# Patient Record
Sex: FEMALE | Race: WHITE
[De-identification: names, ages, dates, MRNs, and addresses within clinical notes are randomized per-mention and may not be internally consistent; named-entity substitution may affect disease eponyms.]

---

## 2017-01-09 ENCOUNTER — HOSPITAL ENCOUNTER (OUTPATIENT)
Dept: HOSPITAL 45 - C.LAB | Age: 82
Discharge: HOME | End: 2017-01-09
Payer: COMMERCIAL

## 2017-01-09 VITALS
BODY MASS INDEX: 26.33 KG/M2 | WEIGHT: 163.8 LBS | HEIGHT: 65.98 IN | BODY MASS INDEX: 26.33 KG/M2 | WEIGHT: 163.8 LBS | HEIGHT: 65.98 IN

## 2017-01-09 DIAGNOSIS — Z01.810: Primary | ICD-10-CM

## 2017-01-09 LAB
ANION GAP SERPL CALC-SCNC: 10 MMOL/L (ref 3–11)
BASOPHILS # BLD: 0.02 K/UL (ref 0–0.2)
BASOPHILS NFR BLD: 0.3 %
BUN SERPL-MCNC: 15 MG/DL (ref 7–18)
BUN/CREAT SERPL: 21.6 (ref 10–20)
CALCIUM SERPL-MCNC: 8.7 MG/DL (ref 8.5–10.1)
CHLORIDE SERPL-SCNC: 102 MMOL/L (ref 98–107)
CO2 SERPL-SCNC: 26 MMOL/L (ref 21–32)
COMPLETE: YES
CREAT CL PREDICTED SERPL C-G-VRATE: 63.9 ML/MIN
CREAT SERPL-MCNC: 0.7 MG/DL (ref 0.6–1.2)
EOSINOPHIL NFR BLD AUTO: 270 K/UL (ref 130–400)
EST. AVERAGE GLUCOSE BLD GHB EST-MCNC: 108 MG/DL
GLUCOSE SERPL-MCNC: 90 MG/DL (ref 70–99)
HCT VFR BLD CALC: 42.2 % (ref 37–47)
IG%: 0.2 %
IMM GRANULOCYTES NFR BLD AUTO: 25.7 %
INR PPP: 0.9 (ref 0.9–1.1)
LYMPHOCYTES # BLD: 1.54 K/UL (ref 1.2–3.4)
MCH RBC QN AUTO: 31.7 PG (ref 25–34)
MCHC RBC AUTO-ENTMCNC: 34.6 G/DL (ref 32–36)
MCV RBC AUTO: 91.7 FL (ref 80–100)
MONOCYTES NFR BLD: 9 %
NEUTROPHILS # BLD AUTO: 2.7 %
NEUTROPHILS NFR BLD AUTO: 62.1 %
PARTIAL THROMBOPLASTIN RATIO: 1
PMV BLD AUTO: 9.7 FL (ref 7.4–10.4)
POTASSIUM SERPL-SCNC: 4.1 MMOL/L (ref 3.5–5.1)
PROTHROMBIN TIME: 9.9 SECONDS (ref 9–12)
RBC # BLD AUTO: 4.6 M/UL (ref 4.2–5.4)
SODIUM SERPL-SCNC: 138 MMOL/L (ref 136–145)
WBC # BLD AUTO: 5.99 K/UL (ref 4.8–10.8)

## 2017-01-09 NOTE — PAT MEDICATION INSTRUCTIONS
Service Date


Jan 9, 2017.





Current Home Medication List


Ascorbic Acid (Vitamin C), 1 CAP PO QAM


Aspirin (Aspirin Tab-Chewable *), 81 MG PO DAILY


Calcium Carbonate-Vitamin D (Calcium), 1,000 MG PO QAM


Cholecalciferol (Vitamin D3), 1 CAP PO QAM


Estrogens, Conjugated (Premarin), 1.25 MG PO QAM


Flurazepam HCl (Flurazepam HCl), 30 MG PO HS PRN for PRN


Latanoprost 0.005% Oph (Xalatan 0.005% Oph), 1 DROP OPL DAILY


Levothyroxine Sodium (Levothyroxine Sodium), 112 MCG PO QAM


Omega-3 Fatty Acids (Fish Oil), 1 CAP PO QAM


Omeprazole (Prilosec), 20 MG PO QAM


Oxycodone/Acetaminophen 5MG/325MG (Oxycodone/Acetaminophen 5MG/325MG), 1-2 

TABLETS PO Q4H PRN for Pain


Sennosides-Docusate Sodium (Senna), 4 TAB PO HS


Sodium Hyaluronate (Viscosuppl (Supartz), 1 DOSE INJ UD


Tocopheryl Acet,Dl-Alpha (Vitamin E), 1 CAP PO QAM


Triamterene/Hctz (Dyazide 37.5MG/25MG), 1 TAB PO QAM





Medication Instructions


For Your Scheduled Surgery 





- Check with surgeon for instructions: 


Estrogens, Conjugated (Premarin), 1.25 MG PO QAM





- Continue as usual: 


Sodium Hyaluronate (Viscosuppl (Supartz), 1 DOSE INJ UD every 6 months





- Hold the following medications 2 weeks prior to surgery:


Tocopheryl Acet,Dl-Alpha (Vitamin E), 1 CAP PO QAM


Omega-3 Fatty Acids (Fish Oil), 1 CAP PO QAM





- Hold the following medications the morning of surgery:


Triamterene/Hctz (Dyazide 37.5MG/25MG), 1 TAB PO QAM


Calcium Carbonate-Vitamin D (Calcium), 1,000 MG PO QAM


Cholecalciferol (Vitamin D3), 1 CAP PO QAM


Ascorbic Acid (Vitamin C), 1 CAP PO QAM





- Take the following medications the morning of surgery with a sip of water:


Omeprazole (Prilosec), 20 MG PO QAM


Levothyroxine Sodium (Levothyroxine Sodium), 112 MCG PO QAM


Latanoprost 0.005% Oph (Xalatan 0.005% Oph), 1 DROP OPL DAILY


Aspirin (Aspirin Tab-Chewable *), 81 MG PO DAILY (currently on hold- okay to 

continue per surgeon if restarted prior to surgery)


Oxycodone/Acetaminophen 5MG/325MG (Oxycodone/Acetaminophen 5MG/325MG), 1-2 

TABLETS PO Q4H PRN for Pain (okay to take up to 4 hours prior to surgery if 

needed)





- Take the following medications as scheduled the night before surgery:


Sennosides-Docusate Sodium (Senna), 4 TAB PO HS


Flurazepam HCl (Flurazepam HCl), 30 MG PO HS PRN for PRN


Oxycodone/Acetaminophen 5MG/325MG (Oxycodone/Acetaminophen 5MG/325MG), 1-2 

TABLETS PO Q4H PRN for Pain





If you have any questions please call us at [phone] (Judith Boyle PA-C)

 or [phone] or [phone]

## 2017-02-23 NOTE — CODING QUERY MEDICAL NECESSITY
SUPPORTING DIAGNOSIS NEEDED



Dr. Longo,



A supporting diagnosis is required for the test/procedure performed on this patient in 
order for us to be reimbursed by the patient's insurance. Please provide a supporting 
diagnosis for the following test/procedure listed below next to the test name along with 
your signature. 



*If there is no additional diagnosis for this patient that would support the following 
test/procedure please document that below next to the test/procedure.



Test(s)/Procedure(s) that require a supporting diagnosis:





* [id number] GLYCATED HEMOGLOBIN             DIAGNOSIS:





***DATE OF SERVICE: 1/9/17***





Provider Signature:  ______________________________  Date:  _______



Thank you  

Kayden Mobley

[facility] Information Management

Phone:  [phone]

Fax:  [phone]



Once completed, please kindly fax back to [phone]



For questions please call [phone]

## 2017-05-02 ENCOUNTER — HOSPITAL ENCOUNTER (OUTPATIENT)
Dept: HOSPITAL 45 - C.LAB | Age: 82
Discharge: HOME | End: 2017-05-02
Attending: INTERNAL MEDICINE
Payer: COMMERCIAL

## 2017-05-02 DIAGNOSIS — E03.9: Primary | ICD-10-CM

## 2017-05-02 LAB
ALBUMIN/GLOB SERPL: 0.8 {RATIO} (ref 0.9–2)
ALP SERPL-CCNC: 56 U/L (ref 45–117)
ALT SERPL-CCNC: 23 U/L (ref 12–78)
ANION GAP SERPL CALC-SCNC: 11 MMOL/L (ref 3–11)
AST SERPL-CCNC: 13 U/L (ref 15–37)
BASOPHILS # BLD: 0.03 K/UL (ref 0–0.2)
BASOPHILS NFR BLD: 0.4 %
BUN SERPL-MCNC: 16 MG/DL (ref 7–18)
BUN/CREAT SERPL: 21 (ref 10–20)
CALCIUM SERPL-MCNC: 8.7 MG/DL (ref 8.5–10.1)
CHLORIDE SERPL-SCNC: 102 MMOL/L (ref 98–107)
CHOLEST/HDLC SERPL: 4 {RATIO}
CO2 SERPL-SCNC: 26 MMOL/L (ref 21–32)
COMPLETE: YES
CREAT SERPL-MCNC: 0.74 MG/DL (ref 0.6–1.2)
EOSINOPHIL NFR BLD AUTO: 283 K/UL (ref 130–400)
GLOBULIN SER-MCNC: 4.1 GM/DL (ref 2.5–4)
GLUCOSE SERPL-MCNC: 97 MG/DL (ref 70–99)
GLUCOSE UR QL: 48 MG/DL
HCT VFR BLD CALC: 42.3 % (ref 37–47)
IG%: 0.1 %
IMM GRANULOCYTES NFR BLD AUTO: 27.9 %
KETONES UR QL STRIP: 75 MG/DL
LYMPHOCYTES # BLD: 2.14 K/UL (ref 1.2–3.4)
MCH RBC QN AUTO: 32.4 PG (ref 25–34)
MCHC RBC AUTO-ENTMCNC: 34.8 G/DL (ref 32–36)
MCV RBC AUTO: 93.2 FL (ref 80–100)
MONOCYTES NFR BLD: 8.1 %
NEUTROPHILS # BLD AUTO: 2.5 %
NEUTROPHILS NFR BLD AUTO: 61 %
NITRITE UR QL STRIP: 339 MG/DL (ref 0–150)
PH UR: 191 MG/DL (ref 0–200)
PMV BLD AUTO: 9.7 FL (ref 7.4–10.4)
POTASSIUM SERPL-SCNC: 3.9 MMOL/L (ref 3.5–5.1)
RBC # BLD AUTO: 4.54 M/UL (ref 4.2–5.4)
SODIUM SERPL-SCNC: 139 MMOL/L (ref 136–145)
TSH SERPL-ACNC: 2.58 UIU/ML (ref 0.3–4.5)
VERY LOW DENSITY LIPOPROT CALC: 68 MG/DL
WBC # BLD AUTO: 7.68 K/UL (ref 4.8–10.8)

## 2017-05-09 NOTE — CODING QUERY MEDICAL NECESSITY
SUPPORTING DIAGNOSIS NEEDED



Dr. Graham,



A supporting diagnosis is required for the test/procedure performed on this patient in 
order for us to be reimbursed by the patient's insurance. Please provide a supporting 
diagnosis for the following test/procedure listed below next to the test name along with 
your signature. 



*If there is no additional diagnosis for this patient that would support the following 
test/procedure please document that below next to the test/procedure.



Test(s)/Procedure(s) that require a supporting diagnosis:





* ([id number]) VITAMIN D ASSAY          DIAGNOSIS:



* ([id number]) B12 VITAMIN LEVEL       DIAGNOSIS:





***DATE OF SERVICE: 5/2/17***





Provider Signature:  ______________________________  Date:  _______



Thank you  

Kayden Mobley

[facility] Information Management

Phone:  [phone]

Fax:  [phone]



Once completed, please kindly fax back to [phone]



For questions please call [phone]

## 2017-05-22 ENCOUNTER — HOSPITAL ENCOUNTER (OUTPATIENT)
Dept: HOSPITAL 45 - C.MAMM | Age: 82
Discharge: HOME | End: 2017-05-22
Attending: INTERNAL MEDICINE
Payer: COMMERCIAL

## 2017-05-22 DIAGNOSIS — Z79.890: Primary | ICD-10-CM

## 2017-05-22 DIAGNOSIS — E55.9: ICD-10-CM

## 2017-05-31 NOTE — CODING QUERY MEDICAL NECESSITY
CQSUPPORTING DIAGNOSIS NEEDED





A supporting diagnosis is required for the test/procedure performed on this patient in 
order for us to be reimbursed by the patient's insurance. Please provide a supporting 
diagnosis for the following test/procedure listed below next to the test name along with 
your signature. 



*If there is no additional diagnosis for this patient that would support the following 
test/procedure please document that below next to the test/procedure.



Test(s)/Procedure(s) that require a supporting diagnosis:





DOS  05/22/17    BONE MINERAL DENSITY STUDIES







Provider Signature:  ______________________________  Date:  _______



Thank you  

Saige Zamora

Health Information Management

Phone:  [phone]

Fax:  [phone]



Once completed, please kindly fax back to [phone]



For questions please call [phone]

## 2017-08-04 ENCOUNTER — HOSPITAL ENCOUNTER (OUTPATIENT)
Dept: HOSPITAL 45 - C.LAB | Age: 82
Discharge: HOME | End: 2017-08-04
Attending: INTERNAL MEDICINE
Payer: COMMERCIAL

## 2017-08-04 DIAGNOSIS — Z00.00: Primary | ICD-10-CM

## 2017-08-04 DIAGNOSIS — R04.0: ICD-10-CM

## 2017-08-04 LAB
ANION GAP SERPL CALC-SCNC: 4 MMOL/L (ref 3–11)
BUN SERPL-MCNC: 16 MG/DL (ref 7–18)
BUN/CREAT SERPL: 24 (ref 10–20)
CALCIUM SERPL-MCNC: 9 MG/DL (ref 8.5–10.1)
CHLORIDE SERPL-SCNC: 103 MMOL/L (ref 98–107)
CO2 SERPL-SCNC: 30 MMOL/L (ref 21–32)
CREAT SERPL-MCNC: 0.68 MG/DL (ref 0.6–1.2)
EOSINOPHIL NFR BLD AUTO: 279 K/UL (ref 130–400)
GLUCOSE SERPL-MCNC: 105 MG/DL (ref 70–99)
HCT VFR BLD CALC: 44.1 % (ref 37–47)
MCH RBC QN AUTO: 31.4 PG (ref 25–34)
MCHC RBC AUTO-ENTMCNC: 33.6 G/DL (ref 32–36)
MCV RBC AUTO: 93.6 FL (ref 80–100)
PMV BLD AUTO: 9.9 FL (ref 7.4–10.4)
POTASSIUM SERPL-SCNC: 3.7 MMOL/L (ref 3.5–5.1)
RBC # BLD AUTO: 4.71 M/UL (ref 4.2–5.4)
SODIUM SERPL-SCNC: 137 MMOL/L (ref 136–145)
WBC # BLD AUTO: 5.53 K/UL (ref 4.8–10.8)

## 2017-08-10 ENCOUNTER — HOSPITAL ENCOUNTER (OUTPATIENT)
Dept: HOSPITAL 45 - C.MRI | Age: 82
Discharge: HOME | End: 2017-08-10
Attending: INTERNAL MEDICINE
Payer: COMMERCIAL

## 2017-08-10 DIAGNOSIS — K86.2: Primary | ICD-10-CM

## 2017-08-10 NOTE — DIAGNOSTIC IMAGING REPORT
MRI ABDOMEN COMBO



CLINICAL HISTORY: PANCREATIC CYST    



TECHNIQUE: Imaging was performed prior to and following IV contrast injection.

(6.5 cc intravenous Gadavist)



COMPARISON STUDY:  8/29/2016



FINDINGS: Coronal and axial planes.



No hepatic masses are visualized.



There is no evidence of intrahepatic biliary ductal dilatation.



No splenic masses are visualized.



No adrenal masses are visualized.



No gallbladder abnormalities are visualized.



There is no evidence of abdominal aortic aneurysm



No renal masses are visualized.



There is a lobular cystic lesion of the pancreatic neck measuring 11 x 7 mm.

This remain similar to the preceding study and likely represents a sidebranch

pancreatic IPMN .



Post contrast images reveal no pathologic enhancement. These are mildly degraded

by motion artifact.



There is no evidence of pathologic adenopathy.



IMPRESSION:  

1. Stable lobular cystic lesion of the pancreatic neck measuring 11 x 7 mm.

There is no pathologic enhancement. This remain similar to the prior study and

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 2 year follow-up would seem

appropriate.







Electronically signed by:  Gabriele Lewis M.D.

8/10/2017 5:42 PM



Dictated Date/Time:  8/10/2017 5:36 PM

## 2017-09-14 ENCOUNTER — HOSPITAL ENCOUNTER (OUTPATIENT)
Dept: HOSPITAL 45 - X.SURG | Age: 82
End: 2017-09-14
Attending: OPHTHALMOLOGY
Payer: COMMERCIAL

## 2017-09-14 VITALS
WEIGHT: 142.31 LBS | BODY MASS INDEX: 22.87 KG/M2 | HEIGHT: 66.26 IN | BODY MASS INDEX: 22.87 KG/M2 | WEIGHT: 142.31 LBS | HEIGHT: 66.26 IN

## 2017-09-14 VITALS — DIASTOLIC BLOOD PRESSURE: 82 MMHG | SYSTOLIC BLOOD PRESSURE: 155 MMHG | OXYGEN SATURATION: 97 % | HEART RATE: 71 BPM

## 2017-09-14 VITALS — TEMPERATURE: 97.34 F

## 2017-09-14 DIAGNOSIS — I10: ICD-10-CM

## 2017-09-14 DIAGNOSIS — E03.9: ICD-10-CM

## 2017-09-14 DIAGNOSIS — H26.9: Primary | ICD-10-CM

## 2017-09-14 DIAGNOSIS — Z90.49: ICD-10-CM

## 2017-09-14 DIAGNOSIS — M48.06: ICD-10-CM

## 2017-09-14 RX ADMIN — TROPICAMIDE SCH DROP: 10 SOLUTION/ DROPS OPHTHALMIC at 08:14

## 2017-09-14 RX ADMIN — TROPICAMIDE SCH DROP: 10 SOLUTION/ DROPS OPHTHALMIC at 08:27

## 2017-09-14 RX ADMIN — KETOROLAC TROMETHAMINE SCH DROP: 5 SOLUTION OPHTHALMIC at 08:32

## 2017-09-14 RX ADMIN — GATIFLOXACIN SCH DROP: 5 SOLUTION/ DROPS OPHTHALMIC at 08:33

## 2017-09-14 RX ADMIN — GATIFLOXACIN SCH DROP: 5 SOLUTION/ DROPS OPHTHALMIC at 08:20

## 2017-09-14 RX ADMIN — KETOROLAC TROMETHAMINE SCH DROP: 5 SOLUTION OPHTHALMIC at 08:18

## 2017-09-14 RX ADMIN — CYCLOPENTOLATE HYDROCHLORIDE SCH DROP: 10 SOLUTION/ DROPS OPHTHALMIC at 08:16

## 2017-09-14 RX ADMIN — CYCLOPENTOLATE HYDROCHLORIDE SCH DROP: 10 SOLUTION/ DROPS OPHTHALMIC at 08:29

## 2017-09-14 RX ADMIN — PHENYLEPHRINE HYDROCHLORIDE SCH DROP: 25 SOLUTION/ DROPS OPHTHALMIC at 08:24

## 2017-09-14 RX ADMIN — PHENYLEPHRINE HYDROCHLORIDE SCH DROP: 25 SOLUTION/ DROPS OPHTHALMIC at 08:13

## 2017-09-14 NOTE — ANESTHESIA PROGRESS NT - [facility]
Anesthesia Post Op Note


Date & Time


Sep 14, 2017 at 09:17





Vital Signs


Pain Intensity:  0





Vital Signs Past 12 Hours








  Date Time  Temp Pulse Resp B/P (MAP) Pulse Ox O2 Delivery O2 Flow Rate FiO2


 


9/14/17 09:05 36.3 73 18 156/72 (100) 99 Room Air  


 


9/14/17 07:41 36.4 89 18 163/86 (111) 96 Room Air  











Notes


Mental Status:  alert / awake / arousable, participated in evaluation


Pt Amnestic to Procedure:  Yes


Nausea / Vomiting:  adequately controlled


Pain:  adequately controlled


Airway Patency, RR, SpO2:  stable & adequate


BP & HR:  stable & adequate


Hydration State:  stable & adequate


Anesthetic Complications:  no major complications apparent

## 2017-09-14 NOTE — [facility] OPERATIVE REPORT
Operative Report


Date of Service


Sep 14, 2017.





Operative Report


1.  PREOPERATIVE DIAGNOSIS:  Cataract of the left eye.





2.  POSTOPERATIVE DIAGNOSIS:  Same.





3.  PROCEDURE:  Phacoemulsification with intraocular lens implantation of the 

left eye.  





SURGEON:  Dr. Mike Quintanilla.  ANESTHESIA:  Topical Lidocaine gel, 1% Non-

Preserved intracameral Lidocaine, and monitored intravenous sedation.  





INDICATIONS FOR THE PROCEDURE:  The patient is a 83 - year-old female with a 

history of cataract of the left eye causing significant visual impairment.  The 

details of the proposed procedure were explained to the patient who asked 

appropriate questions and following discussion of all risks, benefits and 

alternatives agreed to have the procedure done.  Patient had a small pupil and 

therefore plans were made preoperatively to use Kuglen hooks to stretch the 

pupil.





4.  OPERATION AND FINDINGS:  





DESCRIPTION OF PROCEDURE:  After informed consent was obtained, the patient was 

brought to the Operating Room at the [facility].  The 

patient was placed in a supine position and then the left eye was prepped and 

draped in the usual sterile fashion for intraocular surgery.  A drop of topical 

Lidocaine gel was placed in the operative eye.  A wire lid speculum was then 

placed in the fornices.  A corneal paracentesis was then created temporally.  

The Non-Preserved Lidocaine was then instilled into the anterior chamber.  The 

anterior chamber was then pressurized with viscoelastic.  A 2.0 mm clear 

corneal incision was then created temporally.  Kuglen hooks were used to 

stretch the pupil vertically and horizontally.  A cystotome was inserted into 

the anterior chamber and used to create a tear in the anterior lens capsule.  

This capsular tear was then used to create a small flap and the flap was 

dragged in a counterclockwise direction in order to create a continuous 

curvilinear capsulorrhexis.  Hydrodissection was accomplished with balanced 

salt solution.  Phacoemulsification of the lens nucleus was then performed in a 

standard divide-and-conquer technique.  The phaco time was 28 seconds with an 

average power of 23 %.  The remaining cortical material was removed using 

irrigation aspiration.  The capsular bag was then filled with viscoelastic.  A 

Bausch & Lomb MI60L +24.5 diopters lens was then loaded into the injector and 

injected into the capsular bag.  The remaining viscoelastic was removed with 

the irrigation aspiration handpiece.  The wound was hydrated and then checked 

and found to be watertight.  The intraocular pressure was checked and found to 

be adequate.  The wire lid speculum was removed and the patient's face was 

cleaned and dried.  TobraDex ointment was placed in the inferior fornix.  The 

patient was discharged to the Recovery Room having tolerated the procedure 

well.  There were no complications.  The patient will be seen tomorrow in the 

office for follow-up.


I attest to the content of the Intraoperative Record and any orders documented 

therein.  Any exceptions are noted below.

## 2017-09-14 NOTE — DISCHARGE INSTRUCTIONS-[facility]
Discharge Instructions


Date of Service


Sep 14, 2017.





Visit


Reason for Visit:  Cataract Left Eye





Discharge


Discharge Diagnosis / Problem:  cataract





Discharge Goals


Goal(s):  Improve function





Activity Recommendations


Activity Limitations:  per Instructions/Follow-up section





Anesthesia


.





Post Anesthesia Instructions:





If you have had General Anesthesia or IV Sedation:





*  Do not drive today.


*  Resume driving when surgeon permits.


*  Do not make important decisions or sign legal documents today.


*  Call surgeon for:





   1.  Temperature elevations greater than 101 degrees F.


   2.  Uncontrollable pain.


   3.  Excessive bleeding.


   4.  Persistent nausea and vomiting.


   5.  Medication intolerance (nausea, vomiting or rash).





*  For nausea and vomiting use only clear liquids such as: tea, soda, bouillon 

until nausea subsides, then gradually increase diet as tolerated.





*  If you have any concerns or questions, call your surgeon's office.  If 

physician is unavailable and it is an emergency, call 911 or go to the nearest 

emergency room.





.





Instructions / Follow-Up


Instructions / Follow-Up





ACTIVITY RECOMMENDATIONS:





*  No strenuous lifting, jogging or running for 4 days





*  No swimming or yard work for 1 week.





*  Limited bending is permitted, such as putting on shoes.








RETURN TO SCHOOL/WORK:





No work until seen by physician in office.








MEDICATIONS:





Resume previous medications unless instructed otherwise by your surgeon.  This


includes eye drops for glaucoma.





Zymaxid/Gatifloxacin (tan cap) - one drop every 2 hours until bedtime





Nevanac/Ilevro/Prolensa/Ketorolac (gray cap) - one drop every 4 hours until 

bedtime





Prednisolone/Durezol (white/pink cap, SHAKE WELL) - one drop every 2 hours 

until bedtime





Starting tomorrow - all 3 drops every 4 hours until seen in the office





Optive drops - as needed for discomfort








SPECIAL CARE INSTRUCTIONS:





*  Wear eyeshield when sleeping, for four nights.





*  You may wear your own glasses or sunglasses while awake.





*  You may read or watch TV





*  You may shower and wash your face, but be gentle around the eye and pat dry.





*  Blurry vision and mild irritation are normal.





*  Call office if pain is more severe or vision becomes dark at [phone].








FOLLOW UP VISIT:





Follow-up with Dr Quintanilla tomorrow.





Diet Recommendations


Home Diet:  resume previous diet





Procedures


Procedures Performed:  


Left Cataract Phacoemulsification With Intraocular Lens Implant





Pending Studies


Studies pending at discharge:  no





Medical Emergencies








.


Who to Call and When:





Medical Emergencies:  If at any time you feel your situation is an emergency, 

please call 911 immediately.





.





Non-Emergent Contact


Non-Emergency issues call your:  Ophthalmologist





.


.








"Provider Documentation" section prepared by Mike Quintanilla.








.

## 2017-09-18 ENCOUNTER — HOSPITAL ENCOUNTER (OUTPATIENT)
Dept: HOSPITAL 45 - C.MAMM | Age: 82
Discharge: HOME | End: 2017-09-18
Attending: INTERNAL MEDICINE
Payer: COMMERCIAL

## 2017-09-18 DIAGNOSIS — Z12.31: Primary | ICD-10-CM

## 2017-09-18 NOTE — MAMMOGRAPHY REPORT
BILATERAL DIGITAL SCREENING MAMMOGRAM WITH CAD: 9/18/2017

CLINICAL HISTORY: Routine screening.  Patient has no complaints.  





TECHNIQUE: Bilateral CC and MLO views were obtained.  Current study was also evaluated with a Compute
r Aided Detection (CAD) system.  



COMPARISON: Comparison is made to exams dated:  9/14/2015 mammogram, 9/15/2016 mammogram, 9/4/2014 ma
mmogram, 8/29/2013 mammogram, 8/27/2012 mammogram, and 8/22/2011 mammogram - [facility]
nter.   



BREAST COMPOSITION:  There are scattered areas of fibroglandular density in both breasts.  



FINDINGS: There are a few benign coarse calcifications bilaterally.  No suspicious mass, architectura
l distortion or cluster of microcalcifications is seen.  



IMPRESSION:  ACR BI-RADS CATEGORY 1: NEGATIVE

There is no mammographic evidence of malignancy. A 1 year screening mammogram is recommended.  The pa
tient will receive written notification of the results.  





Approximately 10% of breast cancers are not detected with mammography. A negative mammographic report
 should not delay biopsy if a clinically suggestive mass is present.



Tila Merlos M.D.          

ay/:9/18/2017 12:49:17  



Imaging Technologist: Gabi MENDOZA(DAGO)(STEVE), [facility]

letter sent: Normal 1/2  

BI-RADS Code: ACR BI-RADS Category 1: Negative

## 2017-10-10 ENCOUNTER — HOSPITAL ENCOUNTER (OUTPATIENT)
Dept: HOSPITAL 45 - X.SURG | Age: 82
Discharge: HOME | End: 2017-10-10
Attending: OPHTHALMOLOGY
Payer: COMMERCIAL

## 2017-10-10 VITALS
HEIGHT: 66.26 IN | BODY MASS INDEX: 22.87 KG/M2 | HEIGHT: 66.26 IN | WEIGHT: 142.31 LBS | WEIGHT: 142.31 LBS | BODY MASS INDEX: 22.87 KG/M2

## 2017-10-10 VITALS
HEART RATE: 67 BPM | OXYGEN SATURATION: 98 % | DIASTOLIC BLOOD PRESSURE: 65 MMHG | SYSTOLIC BLOOD PRESSURE: 136 MMHG | TEMPERATURE: 97.52 F

## 2017-10-10 DIAGNOSIS — Z98.42: ICD-10-CM

## 2017-10-10 DIAGNOSIS — Z90.89: ICD-10-CM

## 2017-10-10 DIAGNOSIS — Z98.890: ICD-10-CM

## 2017-10-10 DIAGNOSIS — I10: ICD-10-CM

## 2017-10-10 DIAGNOSIS — Z88.0: ICD-10-CM

## 2017-10-10 DIAGNOSIS — H26.9: Primary | ICD-10-CM

## 2017-10-10 RX ADMIN — TROPICAMIDE SCH DROP: 10 SOLUTION/ DROPS OPHTHALMIC at 11:59

## 2017-10-10 RX ADMIN — PHENYLEPHRINE HYDROCHLORIDE SCH DROP: 25 SOLUTION/ DROPS OPHTHALMIC at 11:53

## 2017-10-10 RX ADMIN — GATIFLOXACIN SCH DROP: 5 SOLUTION/ DROPS OPHTHALMIC at 11:56

## 2017-10-10 RX ADMIN — CYCLOPENTOLATE HYDROCHLORIDE SCH DROP: 10 SOLUTION/ DROPS OPHTHALMIC at 11:54

## 2017-10-10 RX ADMIN — TROPICAMIDE SCH DROP: 10 SOLUTION/ DROPS OPHTHALMIC at 11:53

## 2017-10-10 RX ADMIN — CYCLOPENTOLATE HYDROCHLORIDE SCH DROP: 10 SOLUTION/ DROPS OPHTHALMIC at 12:00

## 2017-10-10 RX ADMIN — KETOROLAC TROMETHAMINE SCH DROP: 5 SOLUTION OPHTHALMIC at 11:55

## 2017-10-10 RX ADMIN — KETOROLAC TROMETHAMINE SCH DROP: 5 SOLUTION OPHTHALMIC at 12:01

## 2017-10-10 RX ADMIN — GATIFLOXACIN SCH DROP: 5 SOLUTION/ DROPS OPHTHALMIC at 12:05

## 2017-10-10 RX ADMIN — PHENYLEPHRINE HYDROCHLORIDE SCH DROP: 25 SOLUTION/ DROPS OPHTHALMIC at 11:58

## 2017-10-10 NOTE — [facility] OPERATIVE REPORT
Operative Report


Date of Service


Oct 10, 2017.





Operative Report


1.  PREOPERATIVE DIAGNOSIS:  Cataract of the right eye.





2.  POSTOPERATIVE DIAGNOSIS:  Same.





3.  PROCEDURE:  Phacoemulsification with intraocular lens implantation of the 

right eye.  





SURGEON:  Dr. Mike Quintanilla.  ANESTHESIA:  Topical Lidocaine gel, 1% Non-

Preserved intracameral Lidocaine, and monitored intravenous sedation.  





INDICATIONS FOR THE PROCEDURE:  The patient is a 83 - year-old female with a 

history of cataract of the right eye causing significant visual impairment.  

The details of the proposed procedure were explained to the patient who asked 

appropriate questions and following discussion of all risks, benefits and 

alternatives agreed to have the procedure done.  The patient had a poorly 

dilating pupil and therefore plans were made preoperatively use a malyugin ring.





4.  OPERATION AND FINDINGS:  





DESCRIPTION OF PROCEDURE:  After informed consent was obtained, the patient was 

brought to the Operating Room at the [facility].  The 

patient was placed in a supine position and then the right eye was prepped and 

draped in the usual sterile fashion for intraocular surgery.  A drop of topical 

Lidocaine gel was placed in the operative eye.  A wire lid speculum was then 

placed in the fornices.  A corneal paracentesis was then created temporally.  

The Non-Preserved Lidocaine was then instilled into the anterior chamber.  The 

anterior chamber was then pressurized with viscoelastic.  A 2.0 mm clear 

corneal incision was then created temporally. A mayulgin ring was inserted to 

dilate and stabilize the pupil. A cystotome was inserted into the anterior 

chamber and used to create a tear in the anterior lens capsule.  This capsular 

tear was then used to create a small flap and the flap was dragged in a 

counterclockwise direction in order to create a continuous curvilinear 

capsulorrhexis.  Hydrodissection was accomplished with balanced salt solution.  

Phacoemulsification of the lens nucleus was then performed in a standard divide-

and-conquer technique.  The phaco time was 38 seconds with an average power of 

18 %.  The remaining cortical material was removed using irrigation aspiration.

  The capsular bag was then filled with viscoelastic.  A Bausch & Lomb MI60L +

24.0 diopters lens was then loaded into the injector and injected into the 

capsular bag. The malyugin ring was removed. The remaining viscoelastic was 

removed with the irrigation aspiration handpiece.  The wound was hydrated and 

then checked and found to be watertight.  The intraocular pressure was checked 

and found to be adequate.  The wire lid speculum was removed and the patient's 

face was cleaned and dried.  TobraDex ointment was placed in the inferior 

fornix.  The patient was discharged to the Recovery Room having tolerated the 

procedure well.  There were no complications.  The patient will be seen 

tomorrow in the office for follow-up.


I attest to the content of the Intraoperative Record and any orders documented 

therein.  Any exceptions are noted below.

## 2017-10-10 NOTE — DISCHARGE INSTRUCTIONS-SURGCTR
Discharge Instructions


Date of Service


Oct 10, 2017.





Visit


Reason for Visit:  Cataract Right Eye





Discharge


Discharge Diagnosis / Problem:  cataract





Discharge Goals


Goal(s):  Improve function





Activity Recommendations


Activity Limitations:  per Instructions/Follow-up section





Anesthesia


.





Post Anesthesia Instructions:





If you have had General Anesthesia or IV Sedation:





*  Do not drive today.


*  Resume driving when surgeon permits.


*  Do not make important decisions or sign legal documents today.


*  Call surgeon for:





   1.  Temperature elevations greater than 101 degrees F.


   2.  Uncontrollable pain.


   3.  Excessive bleeding.


   4.  Persistent nausea and vomiting.


   5.  Medication intolerance (nausea, vomiting or rash).





*  For nausea and vomiting use only clear liquids such as: tea, soda, bouillon 

until nausea subsides, then gradually increase diet as tolerated.





*  If you have any concerns or questions, call your surgeon's office.  If 

physician is unavailable and it is an emergency, call 911 or go to the nearest 

emergency room.





.





Diet Recommendations


Home Diet:  resume previous diet





Procedures


Procedures Performed:  


Right Cataract Phacoemulsification With Intraocular Lens Implant





Pending Studies


Studies pending at discharge:  no





Medical Emergencies








.


Who to Call and When:





Medical Emergencies:  If at any time you feel your situation is an emergency, 

please call 911 immediately.





.





Non-Emergent Contact


Non-Emergency issues call your:  Ophthalmologist





.


.








"Provider Documentation" section prepared by Mike Quintanilla.








.

## 2017-10-10 NOTE — ANESTHESIA PROGRESS NT - [facility]
Anesthesia Post Op Note


Date & Time


Oct 10, 2017 at 13:01





Vital Signs


Pain Intensity:  0





Vital Signs Past 12 Hours








  Date Time  Temp Pulse Resp B/P (MAP) Pulse Ox O2 Delivery O2 Flow Rate FiO2


 


10/10/17 12:54 36.0 76 18 131/84 (100) 100 Room Air  


 


10/10/17 11:43 36.4 74 16 148/84 (105) 99 Room Air  











Notes


Mental Status:  alert / awake / arousable, participated in evaluation


Pt Amnestic to Procedure:  Yes


Nausea / Vomiting:  adequately controlled


Pain:  adequately controlled


Airway Patency, RR, SpO2:  stable & adequate


BP & HR:  stable & adequate


Hydration State:  stable & adequate


Anesthetic Complications:  no major complications apparent

## 2017-12-27 ENCOUNTER — HOSPITAL ENCOUNTER (EMERGENCY)
Dept: HOSPITAL 45 - C.EDB | Age: 82
Discharge: HOME | End: 2017-12-27
Payer: COMMERCIAL

## 2017-12-27 VITALS
BODY MASS INDEX: 24.09 KG/M2 | HEIGHT: 65.98 IN | WEIGHT: 149.91 LBS | BODY MASS INDEX: 24.09 KG/M2 | HEIGHT: 65.98 IN | WEIGHT: 149.91 LBS

## 2017-12-27 VITALS
TEMPERATURE: 97.52 F | SYSTOLIC BLOOD PRESSURE: 143 MMHG | OXYGEN SATURATION: 95 % | HEART RATE: 87 BPM | DIASTOLIC BLOOD PRESSURE: 61 MMHG

## 2017-12-27 VITALS — OXYGEN SATURATION: 98 %

## 2017-12-27 DIAGNOSIS — I73.00: ICD-10-CM

## 2017-12-27 DIAGNOSIS — M17.12: ICD-10-CM

## 2017-12-27 DIAGNOSIS — M79.3: ICD-10-CM

## 2017-12-27 DIAGNOSIS — Z79.82: ICD-10-CM

## 2017-12-27 DIAGNOSIS — R00.0: ICD-10-CM

## 2017-12-27 DIAGNOSIS — G35: ICD-10-CM

## 2017-12-27 DIAGNOSIS — R10.10: ICD-10-CM

## 2017-12-27 DIAGNOSIS — K82.9: ICD-10-CM

## 2017-12-27 DIAGNOSIS — Z87.891: ICD-10-CM

## 2017-12-27 DIAGNOSIS — R11.2: Primary | ICD-10-CM

## 2017-12-27 DIAGNOSIS — K25.9: ICD-10-CM

## 2017-12-27 DIAGNOSIS — K86.89: ICD-10-CM

## 2017-12-27 DIAGNOSIS — K57.30: ICD-10-CM

## 2017-12-27 LAB
ALBUMIN/GLOB SERPL: 0.8 {RATIO} (ref 0.9–2)
ALP SERPL-CCNC: 51 U/L (ref 45–117)
ALT SERPL-CCNC: 17 U/L (ref 12–78)
ANION GAP SERPL CALC-SCNC: 10 MMOL/L (ref 3–11)
APPEARANCE UR: CLEAR
AST SERPL-CCNC: 18 U/L (ref 15–37)
BASOPHILS # BLD: 0.01 K/UL (ref 0–0.2)
BASOPHILS NFR BLD: 0.1 %
BILIRUB UR-MCNC: (no result) MG/DL
BUN SERPL-MCNC: 23 MG/DL (ref 7–18)
BUN/CREAT SERPL: 25.4 (ref 10–20)
CALCIUM SERPL-MCNC: 8.3 MG/DL (ref 8.5–10.1)
CHLORIDE SERPL-SCNC: 97 MMOL/L (ref 98–107)
CO2 SERPL-SCNC: 26 MMOL/L (ref 21–32)
COLOR UR: YELLOW
COMPLETE: YES
CREAT CL PREDICTED SERPL C-G-VRATE: 43.8 ML/MIN
CREAT SERPL-MCNC: 0.91 MG/DL (ref 0.6–1.2)
EOSINOPHIL NFR BLD AUTO: 253 K/UL (ref 130–400)
GLOBULIN SER-MCNC: 4.4 GM/DL (ref 2.5–4)
GLUCOSE SERPL-MCNC: 142 MG/DL (ref 70–99)
HCT VFR BLD CALC: 44.9 % (ref 37–47)
IG%: 0.2 %
IMM GRANULOCYTES NFR BLD AUTO: 5.2 %
LYMPHOCYTES # BLD: 0.49 K/UL (ref 1.2–3.4)
MANUAL MICROSCOPIC REQUIRED?: NO
MCH RBC QN AUTO: 33.5 PG (ref 25–34)
MCHC RBC AUTO-ENTMCNC: 35.6 G/DL (ref 32–36)
MCV RBC AUTO: 93.9 FL (ref 80–100)
MONOCYTES NFR BLD: 4 %
NEUTROPHILS # BLD AUTO: 0.1 %
NEUTROPHILS NFR BLD AUTO: 90.4 %
NITRITE UR QL STRIP: (no result)
PH UR STRIP: 5 [PH] (ref 4.5–7.5)
PMV BLD AUTO: 10.5 FL (ref 7.4–10.4)
POTASSIUM SERPL-SCNC: 3.8 MMOL/L (ref 3.5–5.1)
RBC # BLD AUTO: 4.78 M/UL (ref 4.2–5.4)
REVIEW REQ?: NO
SODIUM SERPL-SCNC: 133 MMOL/L (ref 136–145)
SP GR UR STRIP: 1.02 (ref 1–1.03)
URINE BILL WITH OR WITHOUT MIC: (no result)
UROBILINOGEN UR-MCNC: (no result) MG/DL
WBC # BLD AUTO: 9.39 K/UL (ref 4.8–10.8)
ZZUR CULT IF INDIC CLEAN CATCH: NO

## 2017-12-27 NOTE — DIAGNOSTIC IMAGING REPORT
ABD/PELVIS NO IV OR ORAL CONT



CLINICAL HISTORY: 83 years-old Female presenting with upper abd pain, vomiting,

back pain, nausea. 



TECHNIQUE: Multidetector CT of the abdomen and pelvis was performed without the

use of intravenous contrast. IV contrast: None. A dose lowering technique was

used consistent with the principles of ALARA (as low as reasonably achievable). 



COMPARISON: 3/1/2016.



CT DOSE (mGy.cm): The estimated cumulative dose is 346.12 mGy.cm.



FINDINGS:



 topogram: Unremarkable.



Lung bases: Minimal basilar opacities, likely atelectasis. Normal heart size. No

pericardial or pleural effusion. 



Liver: Normal morphology. Normal density.



Biliary: No gross biliary ductal dilatation allowing for noncontrast technique.

Normal gallbladder.



Pancreas: Previously noted cystic lesion at the pancreatic neck is not well

evaluated without intravenous contrast and grossly unchanged, likely small side

branch intraductal papillary mucinous neoplasm or mucinous cyst. No gross

evidence of main pancreatic ductal dilatation allowing for noncontrast

technique.



Spleen: Normal noncontrast appearance.



Adrenal glands: Normal noncontrast appearance.



Kidneys and ureters: Normal noncontrast appearance. No nephrolithiasis. No

hydronephrosis. Normal ureters.



Bladder: Normal.



Pelvic organs: Uterus surgically absent.



Bowel: Normal. No bowel obstruction.



Peritoneal cavity: No free fluid or intraperitoneal gas. Mild mesenteric

infiltration at the root of the small bowel mesentery.



Lymph nodes: No gross lymphadenopathy allowing for noncontrast technique.

Numerous small prominent mesenteric lymph nodes associated with mesenteric

infiltration.



Vasculature: Atherosclerosis of the normal caliber abdominal aorta.



Abdominal wall: Normal.



Musculoskeletal: Degenerative changes of the spine. Prominent posterior bony

spurring at L1-2 narrowing spinal canal. Degenerative related anterolisthesis of

L4 on L5. Old rib fracture of the posterior lateral left 10th rib.



IMPRESSION:

1.  Allowing for noncontrast technique, no significant intra-abdominal

pathology.



2.  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

symptomatic.



3.  Grossly stable noncontrast appearance of the cystic lesion at the pancreatic

neck, likely small side branch intraductal papillary mucinous neoplasm or

mucinous cyst.



4.  Degenerative changes of the spine.











Electronically signed by:  Judson Hathaway M.D.

12/27/2017 1:02 PM



Dictated Date/Time:  12/27/2017 12:54 PM

## 2017-12-27 NOTE — EMERGENCY ROOM VISIT NOTE
History


Report prepared by Emile:  Lloyd More


Under the Supervision of:  ESTHER SterlingO.


First contact with patient:  10:42


Chief Complaint:  ABDOMINAL PAIN


Stated Complaint:  SEVERE PAIN IN ABD,BACK,VOMTING


Nursing Triage Summary:  


pt reports mid upper abd pain started at 2030 last evening, at 2330 started 


vomiting approx 8 times.   drank a half cup of drink prior to leaving home was 


able to keep it down





History of Present Illness


The patient is an 83 year old female who presents to the Emergency Room with 

complaints of persistent abdominal pain for 14 hours PTA. She notes stabbing 

pain in her upper abdomen and that it feels tight. She currently rates her pain 

a 10/10 in severity. She notes that she never went to sleep due to the pain. 

She is unsure if the pain is radiating around to her back or if it is shooting 

straight through from her abdomen. She notes there was temporary pain in her 

right arm. She notes that walking worsens the pain and she becomes lightheaded. 

She notes back pain, vomiting, constipation, and lightheadedness. She notes 

eight episodes of vomiting throughout the night, though no blood is present. 

She notes the vomit is white in color. She notes that she was recently 

diagnosed with an "L-shaped gallbladder and nodule on the head of my pancreas." 

She also has a small ulcer from taking two Advil every day for 10 years due to 

arthritis. The patient has not had a cholecystectomy. She denies any chest pain 

and diarrhea.





   Source of History:  patient


   Onset:  14 hours PTA


   Position:  abdomen


   Symptom Intensity:  10/10


   Quality:  stabbing, other (tight)


   Timing:  other (persistent)


   Modifying Factors (Worsening):  other (walking )


   Associated Symptoms:  + vomiting (no blood, though white in color), + back 

pain, No chest pain, No diarrhea


Note:


She notes constipation and lightheadedness.





Review of Systems


See HPI for pertinent positives & negatives. A total of 10 systems reviewed and 

were otherwise negative.





Past Medical & Surgical


Medical Problems:


(1) Diverticulosis of colon (without mention of hemorrhage)


(2) History of kidney stones


(3) Left knee DJD


(4) Multiple sclerosis


(5) Raynauds syndrome


(6) Raynauds syndrome


(7) Tick bite of forearm








Family History





FHx: cancer





Social History


Smoking Status:  Former Smoker


Alcohol Use:  occasionally


Drug Use:  none


Housing Status:  lives alone


Occupation Status:  retired





Current/Historical Medications


Scheduled


Aspirin (Aspirin Ec), 81 MG PO QAM


Cholecalciferol (Vitamin D3), 2 CAP PO QAM


Estrogens, Conjugated (Premarin), 1.25 MG PO QAM


Levothyroxine Sodium (Levothyroxine Sodium), 112 MCG PO QAM


Sennosides-Docusate Sodium (Senokot S), 4 TABS PO HS


Sodium Hyaluronate (Viscosuppl (Supartz), 1 DOSE INJ UD


Triamterene/Hctz (Dyazide 37.5MG/25MG), 1 TAB PO QAM





Scheduled PRN


Flurazepam HCl (Flurazepam HCl), 30 MG PO HS PRN for PRN


Oxycodone/Acetaminophen 5MG/325MG (Oxycodone/Acetaminophen 5MG/325MG), 1-2 

TABLETS PO Q4H PRN for Pain





Allergies


Coded Allergies:  


     Procaine (Verified  Allergy, Mild, MILD LIP SWELLING, 10/10/17)


 SINGLE EPISODE APPROXIMATELY 50 YEARS AGO. SYMPTOMS X A FEW


 HOURS; RESOLVED SPONTANEOUSLY WITHOUT SEEKING FURTHER


 MEDICAL ATTENTION.


     Iodinated Diagnostic Agents (Verified  Allergy, Unknown, HIVES, 10/10/17)


 OCCURRED IN THE 1960'S AFTER EATING FISH FROM [place]


     Meperidine (Verified  Allergy, Unknown, ENHANCES PAIN, 10/10/17)


     Penicillins (Verified  Allergy, Unknown, SHOCK, 10/10/17)


     Pneumococcal Polysaccharides (Unverified  Allergy, Unknown, ARM SWELLING, 

10/10/17)





Physical Exam


Vital Signs











  Date Time  Temp Pulse Resp B/P (MAP) Pulse Ox O2 Delivery O2 Flow Rate FiO2


 


12/27/17 16:46 36.4 87 16 143/61 95   


 


12/27/17 16:35  87 16  95   


 


12/27/17 16:30    143/61    


 


12/27/17 16:05  89 15  93   


 


12/27/17 16:00    140/59    


 


12/27/17 15:35  91 17  91   


 


12/27/17 15:30    143/52    


 


12/27/17 15:05  92 18  91   


 


12/27/17 15:00    134/57    


 


12/27/17 14:57  91 18  92   


 


12/27/17 14:30    132/57    


 


12/27/17 14:27  95 17  93   


 


12/27/17 14:15    144/62    


 


12/27/17 14:14  93 18 144/62 95 Room Air  


 


12/27/17 13:57  100 21     


 


12/27/17 13:34  96      


 


12/27/17 12:34  94 18 140/62 96 Room Air  


 


12/27/17 12:33    140/62    


 


12/27/17 11:57  98 16  94   


 


12/27/17 11:28  99 20 144/73 97 Room Air  


 


12/27/17 11:25    144/73    


 


12/27/17 10:57  105 29  99   


 


12/27/17 10:27  100 15  95   


 


12/27/17 09:57  92 18  98   


 


12/27/17 09:53     98 Room Air  


 


12/27/17 09:52  93 18 151/84 98 Room Air  


 


12/27/17 09:52  96      


 


12/27/17 09:50    151/84    


 


12/27/17 09:32 36.4 110 18 139/80 96 Room Air  











Physical Exam


GENERAL: alert, well appearing, well nourished, no distress, non-toxic 


EYE EXAM: normal conjunctiva, PERRL and EOM's grossly intact


OROPHARYNX: no exudate, no erythema, lips, buccal mucosa, and tongue normal and 

dry mucous membranes 


NECK: supple, no nuchal rigidity, no adenopathy, non-tender


LUNGS: Clear to auscultation. Normal chest wall mechanics


HEART: no murmurs, S1 normal and S2 normal 


ABDOMEN: abdomen soft, non-tender, normo-active bowel sounds, no masses, no 

rebound or guarding. 


BACK: Back is symmetrical on inspection and there is no deformity, no midline 

tenderness, no CVA tenderness. 


SKIN: no rashes and no bruising 


UPPER EXTREMITIES: upper extremities are grossly normal. 


LOWER EXTREMITIES: No pitting edema.


NEURO EXAM: Normal sensorium, cranial nerves II-XII grossly intact, normal 

speech,  no gross weakness of arms, no gross weakness of legs.





Medical Decision & Procedures


ER Provider


Diagnostic Interpretation:


Radiology results have been interpreted by the radiologist and reviewed by me.





GALLBLADDER-ABD LIMITED





CLINICAL HISTORY: epigastric pain pain. Nausea.





TECHNIQUE: Ultrasound





COMPARISON STUDY:  3/1/2016





FINDINGS: Unchanging cystic nodule of the pancreas currently measuring 1.6 cm at


maximum. This is unchanged.





Liver is uniform. Gallbladder is normal. Common bile duct is 3 mm. Right kidney


is negative for hydronephrosis.





IMPRESSION:  


1. No acute process.








2. Normal gallbladder.








3. Stable cystic lesion of the pancreas which has been previously described 














The above report was generated using voice recognition software.  It may contain


grammatical, syntax or spelling errors.











Electronically signed by:  Polo Levy M.D.


12/27/2017 12:28 PM





Dictated Date/Time:  12/27/2017 12:26 PM














ABD/PELVIS NO IV OR ORAL CONT





CLINICAL HISTORY: 83 years-old Female presenting with upper abd pain, vomiting,


back pain, nausea. 





TECHNIQUE: Multidetector CT of the abdomen and pelvis was performed without the


use of intravenous contrast. IV contrast: None. A dose lowering technique was


used consistent with the principles of ALARA (as low as reasonably achievable). 





COMPARISON: 3/1/2016.





CT DOSE (mGy.cm): The estimated cumulative dose is 346.12 mGy.cm.





FINDINGS:





 topogram: Unremarkable.





Lung bases: Minimal basilar opacities, likely atelectasis. Normal heart size. No


pericardial or pleural effusion. 





Liver: Normal morphology. Normal density.





Biliary: No gross biliary ductal dilatation allowing for noncontrast technique.


Normal gallbladder.





Pancreas: Previously noted cystic lesion at the pancreatic neck is not well


evaluated without intravenous contrast and grossly unchanged, likely small side


branch intraductal papillary mucinous neoplasm or mucinous cyst. No gross


evidence of main pancreatic ductal dilatation allowing for noncontrast


technique.





Spleen: Normal noncontrast appearance.





Adrenal glands: Normal noncontrast appearance.





Kidneys and ureters: Normal noncontrast appearance. No nephrolithiasis. No


hydronephrosis. Normal ureters.





Bladder: Normal.





Pelvic organs: Uterus surgically absent.





Bowel: Normal. No bowel obstruction.





Peritoneal cavity: No free fluid or intraperitoneal gas. Mild mesenteric


infiltration at the root of the small bowel mesentery.





Lymph nodes: No gross lymphadenopathy allowing for noncontrast technique.


Numerous small prominent mesenteric lymph nodes associated with mesenteric


infiltration.





Vasculature: Atherosclerosis of the normal caliber abdominal aorta.





Abdominal wall: Normal.





Musculoskeletal: Degenerative changes of the spine. Prominent posterior bony


spurring at L1-2 narrowing spinal canal. Degenerative related anterolisthesis of


L4 on L5. Old rib fracture of the posterior lateral left 10th rib.





IMPRESSION:


1.  Allowing for noncontrast technique, no significant intra-abdominal


pathology.





2.  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


symptomatic.





3.  Grossly stable noncontrast appearance of the cystic lesion at the pancreatic


neck, likely small side branch intraductal papillary mucinous neoplasm or


mucinous cyst.





4.  Degenerative changes of the spine.

















Electronically signed by:  Judson Hathaway M.D.


12/27/2017 1:02 PM





Dictated Date/Time:  12/27/2017 12:54 PM





Laboratory Results


12/27/17 09:56








Red Blood Count 4.78, Mean Corpuscular Volume 93.9, Mean Corpuscular Hemoglobin 

33.5, Mean Corpuscular Hemoglobin Concent 35.6, Mean Platelet Volume 10.5, 

Neutrophils (%) (Auto) 90.4, Lymphocytes (%) (Auto) 5.2, Monocytes (%) (Auto) 

4.0, Eosinophils (%) (Auto) 0.1, Basophils (%) (Auto) 0.1, Neutrophils # (Auto) 

8.48, Lymphocytes # (Auto) 0.49, Monocytes # (Auto) 0.38, Eosinophils # (Auto) 

0.01, Basophils # (Auto) 0.01





12/27/17 09:56

















Test


  12/27/17


09:56 12/27/17


11:35 12/27/17


14:38 12/27/17


14:44


 


White Blood Count


  9.39 K/uL


(4.8-10.8) 


  


  


 


 


Red Blood Count


  4.78 M/uL


(4.2-5.4) 


  


  


 


 


Hemoglobin


  16.0 g/dL


(12.0-16.0) 


  


  


 


 


Hematocrit 44.9 % (37-47)    


 


Mean Corpuscular Volume


  93.9 fL


() 


  


  


 


 


Mean Corpuscular Hemoglobin


  33.5 pg


(25-34) 


  


  


 


 


Mean Corpuscular Hemoglobin


Concent 35.6 g/dl


(32-36) 


  


  


 


 


Platelet Count


  253 K/uL


(130-400) 


  


  


 


 


Mean Platelet Volume


  10.5 fL


(7.4-10.4) 


  


  


 


 


Neutrophils (%) (Auto) 90.4 %    


 


Lymphocytes (%) (Auto) 5.2 %    


 


Monocytes (%) (Auto) 4.0 %    


 


Eosinophils (%) (Auto) 0.1 %    


 


Basophils (%) (Auto) 0.1 %    


 


Neutrophils # (Auto)


  8.48 K/uL


(1.4-6.5) 


  


  


 


 


Lymphocytes # (Auto)


  0.49 K/uL


(1.2-3.4) 


  


  


 


 


Monocytes # (Auto)


  0.38 K/uL


(0.11-0.59) 


  


  


 


 


Eosinophils # (Auto)


  0.01 K/uL


(0-0.5) 


  


  


 


 


Basophils # (Auto)


  0.01 K/uL


(0-0.2) 


  


  


 


 


RDW Standard Deviation


  43.2 fL


(36.4-46.3) 


  


  


 


 


RDW Coefficient of Variation


  12.5 %


(11.5-14.5) 


  


  


 


 


Immature Granulocyte % (Auto) 0.2 %    


 


Immature Granulocyte # (Auto)


  0.02 K/uL


(0.00-0.02) 


  


  


 


 


Anion Gap


  10.0 mmol/L


(3-11) 


  


  


 


 


Est Creatinine Clear Calc


Drug Dose 43.8 ml/min 


  


  


  


 


 


Estimated GFR (


American) 67.6 


  


  


  


 


 


Estimated GFR (Non-


American 58.3 


  


  


  


 


 


BUN/Creatinine Ratio 25.4 (10-20)    


 


Calcium Level


  8.3 mg/dl


(8.5-10.1) 


  


  


 


 


Total Bilirubin


  0.5 mg/dl


(0.2-1) 


  


  


 


 


Aspartate Amino Transf


(AST/SGOT) 18 U/L (15-37) 


  


  


  


 


 


Alanine Aminotransferase


(ALT/SGPT) 17 U/L (12-78) 


  


  


  


 


 


Alkaline Phosphatase


  51 U/L


() 


  


  


 


 


Total Protein


  7.9 gm/dl


(6.4-8.2) 


  


  


 


 


Albumin


  3.5 gm/dl


(3.4-5.0) 


  


  


 


 


Globulin


  4.4 gm/dl


(2.5-4.0) 


  


  


 


 


Albumin/Globulin Ratio 0.8 (0.9-2)    


 


Lipase


  147 U/L


() 


  


  


 


 


Urine Color  YELLOW   


 


Urine Appearance  CLEAR (CLEAR)   


 


Urine pH  5.0 (4.5-7.5)   


 


Urine Specific Gravity


  


  1.022


(1.000-1.030) 


  


 


 


Urine Protein  NEG (NEG)   


 


Urine Glucose (UA)  NEG (NEG)   


 


Urine Ketones  1+ (NEG)   


 


Urine Occult Blood  NEG (NEG)   


 


Urine Nitrite  NEG (NEG)   


 


Urine Bilirubin  NEG (NEG)   


 


Urine Urobilinogen  NEG (NEG)   


 


Urine Leukocyte Esterase  NEG (NEG)   


 


Troponin I


  


  


  < 0.015 ng/ml


(0-0.045) 


 


 


Bedside Lactic Acid Venous


  


  


  


  0.87 mmol/L


(0.90-1.70)





Laboratory results per my review.





Medications Administered











 Medications


  (Trade)  Dose


 Ordered  Sig/Armin


 Route  Start Time


 Stop Time Status Last Admin


Dose Admin


 


 Sodium Chloride  1,000 ml @ 


 999 mls/hr  Q1H1M STAT


 IV  12/27/17 11:23


 12/27/17 12:23 DC 12/27/17 11:37


999 MLS/HR


 


 Morphine Sulfate


  (MoRPHine


 SULFATE INJ)  4 mg  NOW  STAT


 IV  12/27/17 11:23


 12/27/17 11:24 DC 12/27/17 11:37


4 MG


 


 Sodium Chloride  1,000 ml @ 


 999 mls/hr  Q1H1M STAT


 IV  12/27/17 12:14


 12/27/17 13:14 DC 12/27/17 12:30


999 MLS/HR


 


 Famotidine 20 mg/


 Dextrose  102 ml @ 


 200 mls/hr  NOW  STAT


 IV  12/27/17 13:56


 12/27/17 14:26 DC 12/27/17 14:30


200 MLS/HR


 


 Acetaminophen  100 ml @ 


 400 mls/hr  NOW  STAT


 IV  12/27/17 13:56


 12/27/17 14:10 DC 12/27/17 14:14


400 MLS/HR


 


 Ketorolac


 Tromethamine


  (Toradol Inj)  15 mg  NOW  STAT


 IV  12/27/17 15:41


 12/27/17 15:42 DC 12/27/17 16:34


15 MG


 


 Al Hydroxide/Mg


 Hydroxide


  (Maalox Susp)  15 ml  NOW  STAT


 PO  12/27/17 15:41


 12/27/17 15:42 DC 12/27/17 16:34


15 ML


 


 Ondansetron HCl


  (ZOFRAN ODT 4MG


 Home Pack)  1 homepack  UD  ONCE


 PO  12/27/17 16:15


 12/27/17 16:16 DC 12/27/17 16:34


1 HOMEPACK











ECG


Indication:  abdominal pain


Rate (beats per minute):  103


Rhythm:  sinus tachycardia


Findings:  Q waves (in V2), no acute ischemic change, other (Baseline artifact 

noted. Normal axis. Normal intervals.)





ED Course


1050: The patient was evaluated in room A4B. A complete history and physical 

exam was performed. 





1123: Ordered Morphine Sulfate 4 mg IV and Sodium Chloride 1,000 ml @ 999 mls/

hr IV





1214: Ordered Sodium Chloride 1,000 ml @ 999 mls/hr IV





1350: I reassessed the patient at this time. She is feeling better and resting 

comfortably. 





1356: Ordered Acetaminophen 100 ml @ 400 mls/hr IV and Famotidine 20 mg/

Dextrose 102 ml @ 200 mls/hr IV





1500: I reassessed the patient at this time. She is resting comfortably.





1541: Ordered Maalox 15 ml PO and Toradol 15 mg IV





1601: I reassessed the patient at this time. She is resting comfortably. I 

discussed the results and treatment plan with the patient. I answered all 

pertaining questions that she had. She expressed understanding and verbalized 

agreement. The patient will be discharged home.





1615: Ordered Ondansetron HCl 1 homepack PO.





Medical Decision


Prior records/ancillary studies reviewed.


Triage Nursing notes reviewed.





The patient's history was concerning for abdominal pain. 





Differential diagnosis:


Etiologies such as appendicitis, diverticulitis, PUD, biliary pathology, UTI, 

pancreatitis, obstruction, mesenteric ischemia, aortic pathology, infections, 

inflammatory bowel disease, renal colic, as well as others were entertained. 








Patient well-appearing here despite complaints.  Patient observed for several 

hours due to age and initial presentation.  I feel initially elevated lactic 

acid likely secondary to vomiting and dehydration as it improved following 2 L 

of IV fluids.  Doubt bacteremia/sepsis. Patient was tolerating by mouth without 

any worsening symptoms, and pain was improved.  Discussed all results with 

patient, discussed close follow-up with family doctor, discussed possible 

etiology of possible panniculitis read on CAT scan by radiology.  Patient does 

have a history of rheumatologic disease, no history of trauma, no evidence of 

acute infectious etiology.  Patient tolerated by mouth, ambulated here with a 

steady gait.





Medication Reconcilliation


Current Medication List:  was personally reviewed by me





Blood Pressure Screening


Patient's blood pressure:  Elevated blood pressure


Blood pressure disposition:  Elevated BP felt to be situational





Impression





 Primary Impression:  


 Vomiting


 Additional Impressions:  


 Abdominal pain


 Panniculitis





Scribe Attestation


The scribe's documentation has been prepared under my direction and personally 

reviewed by me in its entirety. I confirm that the note above accurately 

reflects all work, treatment, procedures, and medical decision making performed 

by me.





Departure Information


Dispostion


Home / Self-Care





Referrals


Judson Graham M.D. (PCP)





Forms


HOME CARE DOCUMENTATION FORM,                                                 

               IMPORTANT VISIT INFORMATION





Patient Instructions


Abdominal Pain - [facility], [facility], Vomiting - [facility]





Additional Instructions





Please follow up with your family doctor.  You may use a nausea medication as 

needed.  Please use Tylenol as needed for pain.  If you develop recurrent 

vomiting, develop fevers, diarrhea, worsening pain, dizziness, trouble breathing

, you've any other new concerns, please return the emergency room.





Problem Qualifiers








 Primary Impression:  


 Vomiting


 Vomiting type:  unspecified  Vomiting Intractability:  non-intractable  Nausea 

presence:  with nausea  Qualified Codes:  R11.2 - Nausea with vomiting, 

unspecified


 Additional Impressions:  


 Abdominal pain


 Abdominal location:  upper abdomen, unspecified  Qualified Codes:  R10.10 - 

Upper abdominal pain, unspecified

## 2017-12-27 NOTE — DIAGNOSTIC IMAGING REPORT
GALLBLADDER-ABD LIMITED



CLINICAL HISTORY: epigastric pain pain. Nausea.



TECHNIQUE: Ultrasound



COMPARISON STUDY:  3/1/2016



FINDINGS: Unchanging cystic nodule of the pancreas currently measuring 1.6 cm at

maximum. This is unchanged.



Liver is uniform. Gallbladder is normal. Common bile duct is 3 mm. Right kidney

is negative for hydronephrosis.



IMPRESSION:  

1. No acute process.





2. Normal gallbladder.





3. Stable cystic lesion of the pancreas which has been previously described 









The above report was generated using voice recognition software.  It may contain

grammatical, syntax or spelling errors.







Electronically signed by:  Polo Levy M.D.

12/27/2017 12:28 PM



Dictated Date/Time:  12/27/2017 12:26 PM

## 2018-02-01 ENCOUNTER — HOSPITAL ENCOUNTER (OUTPATIENT)
Dept: HOSPITAL 45 - C.LABBC | Age: 83
Discharge: HOME | End: 2018-02-01
Attending: INTERNAL MEDICINE
Payer: COMMERCIAL

## 2018-02-01 DIAGNOSIS — E55.9: ICD-10-CM

## 2018-02-01 DIAGNOSIS — E03.9: Primary | ICD-10-CM

## 2018-02-01 DIAGNOSIS — M13.0: ICD-10-CM

## 2018-02-01 DIAGNOSIS — R20.2: ICD-10-CM

## 2018-02-01 DIAGNOSIS — G03.1: ICD-10-CM

## 2018-02-06 LAB
ANA SCREEN  TC 249X: NEGATIVE
ANTICARDIOLIPID AB IGA: <11 APL
COMPLEMENT C3 TC 44859W: 131 MG/DL (ref 90–180)
COMPLEMENT C4 TC 44982E: 14 MG/DL (ref 16–47)
ENA SS-A IGG SER QL IA: (no result) AI
ENA SS-B AB SER-ACNC: (no result) AI
THYROPEROXIDASE AB SERPL-ACNC: 102 IU/ML (ref ?–9)

## 2018-02-09 ENCOUNTER — HOSPITAL ENCOUNTER (OUTPATIENT)
Dept: HOSPITAL 45 - C.LAB | Age: 83
Discharge: HOME | End: 2018-02-09
Attending: INTERNAL MEDICINE
Payer: COMMERCIAL

## 2018-02-09 DIAGNOSIS — Z01.812: Primary | ICD-10-CM

## 2018-02-09 LAB
BUN SERPL-MCNC: 18 MG/DL (ref 7–18)
CREAT SERPL-MCNC: 0.75 MG/DL (ref 0.6–1.2)

## 2018-02-14 ENCOUNTER — HOSPITAL ENCOUNTER (OUTPATIENT)
Dept: HOSPITAL 45 - C.MRI | Age: 83
End: 2018-02-14
Attending: INTERNAL MEDICINE
Payer: COMMERCIAL

## 2018-02-14 DIAGNOSIS — R20.2: ICD-10-CM

## 2018-02-14 DIAGNOSIS — G35: Primary | ICD-10-CM

## 2018-02-14 DIAGNOSIS — M13.0: ICD-10-CM

## 2018-02-14 DIAGNOSIS — M54.16: ICD-10-CM

## 2018-02-14 NOTE — DIAGNOSTIC IMAGING REPORT
LUMBAR SPINE MRI



HISTORY:      Left-sided back pain.



TECHNIQUE: Multiplanar multisequence MRI of the lumbar spine was performed

without the use of contrast.



COMPARISON:  None.



FINDINGS: For the purpose of the report the L5-S1 disc space will be located on

axial image 23 of 25.

5 mm of anterolisthesis of L4 and L5. No acute fractures within the lumbar

spine. The conus terminus at L1. There is 3 mm of retrolisthesis of L1 on L2.

Severe disc space narrowing at L1-L2, L2-L3. Moderate to space narrowing at

L3-L4 and L4-L5. Advanced facet degenerative changes within the lumbar spine.

Paraspinal soft tissues are unremarkable. There are few small lower lateral

thoracic meningoceles within the bilateral neural foramen. These are considered

to be normal variants. There are small Tarlov cysts at the S1-S3 levels.. 



T12-L1: Small focal left paracentral disc protrusion without significant central

canal or neural foraminal narrowing.



L1-L2: Broad-based posterior disc osteophyte complex with a small central/left

paracentral focal disc protrusion demonstrating mild inferior left

subligamentous migration. This results in mild central canal narrowing. There is

also moderate bilateral neural foraminal narrowing.



L2-L3: Broad-based posterior disc bulge with ligamentum and facet hypertrophy

resulting in mild central canal and mild bilateral neural foraminal narrowing.



L3-L4: Broad-based posterior disc bulge with ligamentum and facet hypertrophy

resulting in moderate central canal and mild bilateral neural foraminal

narrowing.



L4-L5: Broad-based posterior disc bulge in conjunction with the

spondylolisthesis and advanced ligamentum and facet hypertrophy results in

severe central canal and moderate bilateral neural foraminal narrowing.



L5-S1: No significant central canal or neural foraminal narrowing. Cystic focus

within the right neural foramen which measures 1.3 cm. This may also represent a

lateral meningocele/perineural cyst. This is of doubtful clinical significance.



IMPRESSION:  



1. Multilevel lumbar spondylosis as described above most pronounced at the L4-L5

level where there is severe central canal narrowing.

2. No fractures identified within the lumbar spine.

3. Grade I retrolisthesis of L1 on L2 and grade I anterolisthesis of L4 on L5. 







Electronically signed by:  Ceferino Veronica M.D.

2/14/2018 2:00 PM



Dictated Date/Time:  2/14/2018 1:49 PM

## 2018-02-14 NOTE — DIAGNOSTIC IMAGING REPORT
THORACIC SPINE WITHOUT



HISTORY:  83 years-old Female G35 patient presents with acute left-sided lumbar

spine pain with history of prior multiple sclerosis and lupus. No known injury.

History of skin cancer.



COMPARISON: MRI lumbar spine of same day



TECHNIQUE: Multiplanar multisequence MRI of the thoracic spine was obtained

without contrast.



FINDINGS: 

Multilevel advanced degenerative changes of the lumbar spine are noted, dictated

separately on MRI lumbar spine of same day. No gross abnormality seen on the

large field-of-view  localizer images. Signal within the thoracic spinal

cord appears to be within normal limits without focal lesions identified. Conus

medullaris terminates at the L1 level. There is no acute fracture or subluxation

identified. Schmorl's node involves the inferior endplate T12. Mild Modic type I

endplate changes involve the anteroinferior portion of the T11 vertebral body.

Mild posterior disc bulging is noted at several levels, notably at T6-T9 and

also at T10-L1 without significant central canal or foraminal narrowing. 2.1 x

1.1 cm perineural root sleeve cyst is noted on the left at C7-T1. Mild

multilevel endplate spurring. 9 mm perineural root sleeve cyst is seen on the

right at T3-T4. 4 mm perineural root sleeve cyst seen on the right at T6-T7.

Multilevel moderate facet arthrosis and ligamentum flavum thickening.



No aortic aneurysm or pathologic adenopathy identified. Soft tissues are

unremarkable. Multilevel degenerative changes are seen within the mid cervical

spine with reversal of the cervical lordosis. No acute abnormality is seen

within the imaged brain. 4 mm anterolisthesis C7 on T1 is likely secondary to

associated long-standing facet disease.



IMPRESSION: 

1. No acute fracture or subluxation of the thoracic spine.

2. Mild multilevel posterior disc bulging with spondylitic spurring and moderate

facet arthrosis with ligament of flavum thickening. No significant central canal

or foraminal narrowing.

3. No focal signal abnormalities identified within the thoracic spinal cord.

4. 4 mm anterolisthesis C7 on T1 is likely secondary to associated long-standing

facet disease.







The above report was generated using voice recognition software. It may contain

grammatical, syntax or spelling errors.







Electronically signed by:  Hardik Frey M.D.

2/14/2018 2:16 PM



Dictated Date/Time:  2/14/2018 2:06 PM

## 2018-02-14 NOTE — DIAGNOSTIC IMAGING REPORT
MRI OF THE BRAIN WITHOUT AND WITH IV CONTRAST MULTIPLE SCLEROSIS PROTOCOL



CLINICAL HISTORY: Multiple sclerosis. Lupus.    



COMPARISON STUDY:  MRI of the brain February 25, 2016. 



TECHNIQUE:  Utilizing a 1.5 Viviana magnet and dedicated coil, multiplanar,

multiecho imaging of the brain was performed pre and postcontrast

administration.  IV administration of 6.5 mL of Gadavist contrast was

uneventful.



FINDINGS: There are no foci of restricted diffusion. No acute intracranial

hemorrhage, midline shift or mass effect is present. Ventricular system is

unremarkable. Basilar cisterns are patent. Flow-voids for the major intracranial

vessels are present. Multiple subcortical and periventricular white matter T2

hyperintense foci are similar to MRI of February 25, 2016. No new foci of signal

abnormality are present. No parenchymal enhancement is identified on this

examination. Prominence of the extra-axial spaces is unchanged and likely due to

atrophy. There is diffuse pachymeningeal dural enhancement which has slightly

diminished since exam of February 25, 2016.



IMPRESSION:  



1. No acute intracranial findings.



2. No change in multiple periventricular and subcortical white matter T2

hyperintense foci since MRI of February 25, 2016. The findings may reflect

demyelinating plaques or small vessel disease. No evidence for active

demyelination. No new plaques.



3. Diffuse pachymeningeal dural enhancement which is nonspecific but diminished

since MRI of February 25, 2016.







Electronically signed by:  Chris Karimi M.D.

2/14/2018 3:04 PM



Dictated Date/Time:  2/14/2018 2:55 PM

## 2018-03-20 ENCOUNTER — HOSPITAL ENCOUNTER (OUTPATIENT)
Dept: HOSPITAL 45 - C.LAB | Age: 83
Discharge: HOME | End: 2018-03-20
Attending: INTERNAL MEDICINE
Payer: COMMERCIAL

## 2018-03-20 DIAGNOSIS — E03.9: Primary | ICD-10-CM
